# Patient Record
Sex: FEMALE | Race: BLACK OR AFRICAN AMERICAN | NOT HISPANIC OR LATINO | URBAN - METROPOLITAN AREA
[De-identification: names, ages, dates, MRNs, and addresses within clinical notes are randomized per-mention and may not be internally consistent; named-entity substitution may affect disease eponyms.]

---

## 2020-12-02 ENCOUNTER — EMERGENCY (EMERGENCY)
Facility: HOSPITAL | Age: 35
LOS: 1 days | Discharge: HOME | End: 2020-12-02
Attending: EMERGENCY MEDICINE | Admitting: EMERGENCY MEDICINE
Payer: COMMERCIAL

## 2020-12-02 VITALS
RESPIRATION RATE: 20 BRPM | TEMPERATURE: 98 F | SYSTOLIC BLOOD PRESSURE: 141 MMHG | HEART RATE: 83 BPM | OXYGEN SATURATION: 100 % | DIASTOLIC BLOOD PRESSURE: 82 MMHG

## 2020-12-02 VITALS
SYSTOLIC BLOOD PRESSURE: 134 MMHG | DIASTOLIC BLOOD PRESSURE: 94 MMHG | HEART RATE: 80 BPM | WEIGHT: 250 LBS | TEMPERATURE: 100 F | OXYGEN SATURATION: 100 % | HEIGHT: 69 IN | RESPIRATION RATE: 18 BRPM

## 2020-12-02 LAB
HCG UR QL: NEGATIVE — SIGNIFICANT CHANGE UP
HIV 1 & 2 AB SERPL IA.RAPID: SIGNIFICANT CHANGE UP

## 2020-12-02 PROCEDURE — 76830 TRANSVAGINAL US NON-OB: CPT | Mod: 26

## 2020-12-02 PROCEDURE — 81025 URINE PREGNANCY TEST: CPT

## 2020-12-02 PROCEDURE — 99284 EMERGENCY DEPT VISIT MOD MDM: CPT | Mod: 25

## 2020-12-02 PROCEDURE — 36415 COLL VENOUS BLD VENIPUNCTURE: CPT

## 2020-12-02 PROCEDURE — 86703 HIV-1/HIV-2 1 RESULT ANTBDY: CPT

## 2020-12-02 PROCEDURE — 76830 TRANSVAGINAL US NON-OB: CPT

## 2020-12-02 PROCEDURE — 99284 EMERGENCY DEPT VISIT MOD MDM: CPT

## 2020-12-02 NOTE — ED ADULT NURSE NOTE - OBJECTIVE STATEMENT
last GYN exam 2019 was normal, no hx of PID, , denies sob/lightheadedness reported, also c/o left side lower abdominal pain, pending MD's eval, will continue to monitor.     "I had my period 2 weeks ago and it was a heavy flow with blood clots; now I am bleeding again since  but it is a heavy flow; I changed my pas 5x last night; today I changed my pad 2x but I ham getting a lot of clots; I am having pain on my left side of my stomach today and yesterday I was having pain on my right side of my stomach"

## 2020-12-02 NOTE — ED ADULT NURSE NOTE - NSIMPLEMENTINTERV_GEN_ALL_ED
Implemented All Universal Safety Interventions:  [place] to call system. Call bell, personal items and telephone within reach. Instruct patient to call for assistance. Room bathroom lighting operational. Non-slip footwear when patient is off stretcher. Physically safe environment: no spills, clutter or unnecessary equipment. Stretcher in lowest position, wheels locked, appropriate side rails in place.

## 2020-12-02 NOTE — ED PROVIDER NOTE - OBJECTIVE STATEMENT
35 y.o. F c/o heavy vaginal bleeding and clots - just had a menstrual period 2 wks ago similar/heavy, this started 3d ago again, called her GYN and was given an appointment in 6 wks

## 2020-12-02 NOTE — ED PROVIDER NOTE - NSFOLLOWUPINSTRUCTIONS_ED_ALL_ED_FT
Menorrhagia      Menorrhagia is when your menstrual periods are heavy or last longer than normal.      Follow these instructions at home:      Medicines      •Take over-the-counter and prescription medicines exactly as told by your doctor. This includes iron pills.      • Do not change or switch medicines without asking your doctor.      • Do not take aspirin or medicines that contain aspirin 1 week before or during your period. Aspirin may make bleeding worse.      General instructions     •If you need to change your pad or tampon more than once every 2 hours, limit your activity until the bleeding stops.    •Iron pills can cause problems when pooping (constipation). To prevent or treat pooping problems while taking prescription iron pills, your doctor may suggest that you:  •Drink enough fluid to keep your pee (urine) clear or pale yellow.      •Take over-the-counter or prescription medicines.    •Eat foods that are high in fiber. These foods include:  •Fresh fruits and vegetables.      •Whole grains.      •Beans.        •Limit foods that are high in fat and processed sugars. This includes fried and sweet foods.      •Eat healthy meals and foods that are high in iron. Foods that have a lot of iron include:  •Leafy green vegetables.      •Meat.      •Liver.      •Eggs.      •Whole grain breads and cereals.        • Do not try to lose weight until your heavy bleeding has stopped and you have normal amounts of iron in your blood. If you need to lose weight, work with your doctor.      •Keep all follow-up visits as told by your doctor. This is important.        Contact a doctor if:    •You soak through a pad or tampon every 1 or 2 hours, and this happens every time you have a period.      •You need to use pads and tampons at the same time because you are bleeding so much.    •You are taking medicine and you:  •Feel sick to your stomach (nauseous).      •Throw up (vomit).      •Have watery poop (diarrhea).        •You have other problems that may be related to the medicine you are taking.        Get help right away if:    •You soak through more than a pad or tampon in 1 hour.      •You pass clots bigger than 1 inch (2.5 cm) wide.      •You feel short of breath.      •You feel like your heart is beating too fast.      •You feel dizzy or you pass out (faint).      •You feel very weak or tired.        Summary    •Menorrhagia is when your menstrual periods are heavy or last longer than normal.      •Take over-the-counter and prescription medicines exactly as told by your doctor. This includes iron pills.      •Contact a doctor if you soak through more than a pad or tampon in 1 hour or are passing large clots.      This information is not intended to replace advice given to you by your health care provider. Make sure you discuss any questions you have with your health care provider.

## 2020-12-02 NOTE — ED ADULT TRIAGE NOTE - CHIEF COMPLAINT QUOTE
I had my period 2 weeks ago and it was a heavy flow with blood clots; now I am bleeding again since Sunday but it is a heavy flow; I changed my pas 5x last night; today I changed my pad 2x but I ham getting a lot of clots; I am having pain on my left side of my stomach today and yesterday I was having pain on my right side of my stomach

## 2020-12-02 NOTE — ED PROVIDER NOTE - PROGRESS NOTE DETAILS
discussed US findings with pt, bleeding has slowed today, needs to see her GYN, if unable to obtain appointment in reasonable amount of time, I will provide referrals for other GYN providers, copy of results provided

## 2020-12-02 NOTE — ED PROVIDER NOTE - PROVIDER TOKENS
PROVIDER:[TOKEN:[[id number]],FOLLOWUP:[1-3 Days]] PROVIDER:[TOKEN:[[id number]],FOLLOWUP:[1-3 Days]],PROVIDER:[TOKEN:[[id number]],FOLLOWUP:[1-3 Days]]

## 2020-12-02 NOTE — ED PROVIDER NOTE - CARE PROVIDER_API CALL
Jose Beverly  OBSTETRICS AND GYNECOLOGY  [address] [place] [phone]  Phone: [phone]  Fax: [phone]  Follow Up Time: 1-3 Days   Jose Beverly  OBSTETRICS AND GYNECOLOGY  [address] [place] [id number]  Phone: [phone]  Fax: [phone]  Follow Up Time: 1-3 Days    Alberto Connolly)  Obstetrics and Gynecology  [address] [place] [address], [address] [place], [place] [id number]  Phone: [phone]  Fax: [phone]  Follow Up Time: 1-3 Days

## 2020-12-02 NOTE — ED PROVIDER NOTE - PATIENT PORTAL LINK FT
You can access the FollowMyHealth Patient Portal offered by [facility] by registering at the following website: [email or web address]. By joining [employer]’s FollowMyHealth portal, you will also be able to view your health information using other applications (apps) compatible with our system.

## 2020-12-02 NOTE — ED ADULT TRIAGE NOTE - PRO INTERPRETER NEED 2
I have reviewed and confirmed nurses' notes for patient's medications, allergies, medical history, and surgical history. English

## 2021-09-13 ENCOUNTER — EMERGENCY (EMERGENCY)
Facility: HOSPITAL | Age: 36
LOS: 1 days | Discharge: HOME | End: 2021-09-13
Attending: EMERGENCY MEDICINE | Admitting: EMERGENCY MEDICINE
Payer: COMMERCIAL

## 2021-09-13 VITALS
HEART RATE: 74 BPM | HEIGHT: 69 IN | OXYGEN SATURATION: 100 % | DIASTOLIC BLOOD PRESSURE: 88 MMHG | RESPIRATION RATE: 16 BRPM | TEMPERATURE: 99 F | WEIGHT: 293 LBS | SYSTOLIC BLOOD PRESSURE: 151 MMHG

## 2021-09-13 PROBLEM — E66.9 OBESITY, UNSPECIFIED: Chronic | Status: ACTIVE | Noted: 2020-12-02

## 2021-09-13 PROCEDURE — 73090 X-RAY EXAM OF FOREARM: CPT | Mod: 26,LT

## 2021-09-13 PROCEDURE — 99284 EMERGENCY DEPT VISIT MOD MDM: CPT

## 2021-09-13 PROCEDURE — 73110 X-RAY EXAM OF WRIST: CPT | Mod: 26,RT

## 2021-09-13 PROCEDURE — 99284 EMERGENCY DEPT VISIT MOD MDM: CPT | Mod: 25

## 2021-09-13 PROCEDURE — 73110 X-RAY EXAM OF WRIST: CPT

## 2021-09-13 PROCEDURE — 73090 X-RAY EXAM OF FOREARM: CPT

## 2021-09-13 RX ORDER — CYCLOBENZAPRINE HYDROCHLORIDE 10 MG/1
1 TABLET, FILM COATED ORAL
Qty: 30 | Refills: 0
Start: 2021-09-13

## 2021-09-13 RX ORDER — LIDOCAINE 4 G/100G
1 CREAM TOPICAL
Qty: 30 | Refills: 0
Start: 2021-09-13 | End: 2021-10-12

## 2021-09-13 RX ORDER — IBUPROFEN 200 MG
1 TABLET ORAL
Qty: 30 | Refills: 0
Start: 2021-09-13

## 2021-09-13 NOTE — ED ADULT NURSE NOTE - CAS DISCH TRANSFER METHOD
Private car

## 2021-09-13 NOTE — ED PROVIDER NOTE - NSFOLLOWUPINSTRUCTIONS_ED_ALL_ED_FT
Log Out.      [employer] CareNotes®     :  [facility]  	                       MUSCLE SPASM - AfterCare(R) Instructions(ER/ED)           Muscle Spasm    WHAT YOU NEED TO KNOW:    A muscle spasm is a sudden contraction of any muscle or group of muscles. A muscle cramp is a painful muscle spasm. Muscle cramps commonly occur after intense exercise or during pregnancy. They may also be caused by certain medications, dehydration, low calcium or magnesium levels, or another medical condition.     DISCHARGE INSTRUCTIONS:    Medicines: You may need the following:   •NSAIDs help decrease swelling and pain or fever. This medicine is available with or without a doctor's order. NSAIDs can cause stomach bleeding or kidney problems in certain people. If you take blood thinner medicine, always ask your healthcare provider if NSAIDs are safe for you. Always read the medicine label and follow directions.      •Take your medicine as directed. Contact your healthcare provider if you think your medicine is not helping or if you have side effects. Tell him of her if you are allergic to any medicine. Keep a list of the medicines, vitamins, and herbs you take. Include the amounts, and when and why you take them. Bring the list or the pill bottles to follow-up visits. Carry your medicine list with you in case of an emergency.      Follow up with your healthcare provider as directed: You may need other tests or treatment. You may also be referred to a physical therapist or other specialist. Write down your questions so you remember to ask them during your visits.     Self-care:   •Stretch your muscle to help relieve the cramp. It may be helpful to keep your muscle in the stretched position until the cramp is gone.       •Apply heat to help decrease pain and muscle spasms. Apply heat on the area for 20 to 30 minutes every 2 hours for as many days as directed.       •Apply ice to help decrease swelling and pain. Ice may also help prevent tissue damage. Use an ice pack, or put crushed ice in a plastic bag. Cover it with a towel and place it on your muscle for 15 to 20 minutes every hour or as directed.      •Drink more liquids to help prevent muscle cramps caused by dehydration. Sports drinks may help replace electrolytes you lose through sweat during exercise. Ask your healthcare provider how much liquid to drink each day and which liquids are best for you.       •Eat healthy foods, such as fruits, vegetables, whole grains, low-fat dairy products, and lean proteins (meat, beans, and fish). If you are pregnant, ask your healthcare provider about foods that are high in magnesium and sodium. They may help to relieve cramps during pregnancy.       •Massage your muscle to help relieve the cramp.       •Take frequent deep breaths until the cramp feels better. Lie down while you take the deep breaths so you do not get dizzy or lightheaded.      Contact your healthcare provider if:   •You have signs of dehydration, such as a headache, dark yellow urine, dry eyes or mouth, or a fast heartbeat.       •You have questions or concerns about your condition or care.      Return to the emergency department if:   •You have warmth, swelling, or redness in the cramping muscle.       •You have frequent or unrelieved muscle cramps in several different muscles.       •You have muscle cramps with numbness, tingling, and burning in your hands and feet.
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
## 2021-09-13 NOTE — ED PROVIDER NOTE - PATIENT PORTAL LINK FT
You can access the FollowMyHealth Patient Portal offered by [facility] by registering at the following website: [email or web address]. By joining [employer]’s FollowMyHealth portal, you will also be able to view your health information using other applications (apps) compatible with our system.
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
